# Patient Record
Sex: FEMALE | URBAN - METROPOLITAN AREA
[De-identification: names, ages, dates, MRNs, and addresses within clinical notes are randomized per-mention and may not be internally consistent; named-entity substitution may affect disease eponyms.]

---

## 2024-05-09 ENCOUNTER — APPOINTMENT (RX ONLY)
Dept: URBAN - METROPOLITAN AREA CLINIC 55 | Facility: CLINIC | Age: 64
Setting detail: DERMATOLOGY
End: 2024-05-09

## 2024-05-09 DIAGNOSIS — Z41.9 ENCOUNTER FOR PROCEDURE FOR PURPOSES OTHER THAN REMEDYING HEALTH STATE, UNSPECIFIED: ICD-10-CM

## 2024-05-09 PROCEDURE — ? IN-HOUSE DISPENSING PHARMACY

## 2024-05-09 PROCEDURE — ? INVENTORY

## 2024-05-09 PROCEDURE — ? COSMETIC CONSULTATION: GENERAL

## 2024-05-09 NOTE — PROCEDURE: IN-HOUSE DISPENSING PHARMACY
Product 52 Refills: 0
Product 5 Amount/Unit (Numbers Only): 30
Product 32 Unit Type: mg
Render Product Pricing In Note: Yes
Detail Level: Zone
Product 6 Application Directions: Apply nightly or as directed. Use SPF daily.
Name Of Product 3: Acne Triple Combination Gel
Product 6 Unit Type: ml
Product 3 Refills: 11
Send Charges To Patient Encounter: No
Name Of Product 1: Anti-Aging Brightening Cream
Name Of Product 4: Hydroquinone 8% Emulsion
Product 1 Refills: 2
Product 2 Application Directions: Apply nightly or as directed.
Product 4 Units Dispensed: 1
Product 7 Application Directions: Apply only as directed
Name Of Product 5: Hydrating Tretinoin 0.025% Cream
Name Of Product 6: Rosacea Triple Combination Gel
Name Of Product 7: Lidocaine 23% / Tetracaine 7% Cream
Name Of Product 2: Dapsone / Spironolactone Gel

## 2024-05-31 ENCOUNTER — RX ONLY (OUTPATIENT)
Age: 64
Setting detail: RX ONLY
End: 2024-05-31

## 2024-06-06 ENCOUNTER — APPOINTMENT (RX ONLY)
Dept: URBAN - METROPOLITAN AREA CLINIC 55 | Facility: CLINIC | Age: 64
Setting detail: DERMATOLOGY
End: 2024-06-06

## 2024-06-06 DIAGNOSIS — Z41.9 ENCOUNTER FOR PROCEDURE FOR PURPOSES OTHER THAN REMEDYING HEALTH STATE, UNSPECIFIED: ICD-10-CM

## 2024-06-06 DIAGNOSIS — L82.1 OTHER SEBORRHEIC KERATOSIS: ICD-10-CM

## 2024-06-06 PROCEDURE — ? INVENTORY

## 2024-06-06 PROCEDURE — ? LIQUID NITROGEN (COSMETIC)

## 2024-06-06 PROCEDURE — ? FRAXEL

## 2024-06-06 ASSESSMENT — LOCATION DETAILED DESCRIPTION DERM
LOCATION DETAILED: RIGHT EYEBROW
LOCATION DETAILED: LEFT LATERAL BUCCAL CHEEK
LOCATION DETAILED: LEFT INFERIOR LATERAL MALAR CHEEK
LOCATION DETAILED: LEFT CENTRAL ZYGOMA
LOCATION DETAILED: RIGHT INFERIOR CENTRAL MALAR CHEEK
LOCATION DETAILED: LEFT CENTRAL MANDIBULAR CHEEK
LOCATION DETAILED: RIGHT CENTRAL TEMPLE

## 2024-06-06 ASSESSMENT — LOCATION SIMPLE DESCRIPTION DERM
LOCATION SIMPLE: RIGHT TEMPLE
LOCATION SIMPLE: RIGHT CHEEK
LOCATION SIMPLE: RIGHT EYEBROW
LOCATION SIMPLE: LEFT CHEEK
LOCATION SIMPLE: LEFT ZYGOMA

## 2024-06-06 ASSESSMENT — LOCATION ZONE DERM: LOCATION ZONE: FACE

## 2024-06-06 NOTE — PROCEDURE: LIQUID NITROGEN (COSMETIC)
Total Number Of Lesions Treated: 5
Post-Care Instructions: I reviewed with the patient in detail post-care instructions. Patient is to wear sunprotection, and avoid picking at any of the treated lesions. Pt may apply Vaseline to crusted or scabbing areas.
Duration Of Freeze Thaw-Cycle (Seconds): 0
Render Post Care In The Note?: yes
Consent: The patient's consent was obtained including but not limited to risks of crusting, scabbing, blistering, scarring, darker or lighter pigmentary change, recurrence, incomplete removal and infection.
Detail Level: Zone

## 2024-06-06 NOTE — PROCEDURE: FRAXEL
Small Metal Eye Shield Text: The ocular mucosa was anesthetized with tetracaine. Once adequate anesthesia was optained, small metal eye shields were inserted and remained in place until the procedure was completed.
Anesthesia Type: 1% lidocaine with epinephrine
Energy(Mj/Cm2): 1
Wavelength: 1927nm
Energy(Mj/Cm2): 40
Depth In Microns (Use Numbers Only, No Special Characters Or $): 971
Number Of Passes: 4
Topical Anesthesia Type: 20% benzocaine, 8% lidocaine, 4% tetracaine
Total Energy In Kj (Optional- Don't Include Units): 2.17
Depth In Microns (Use Numbers Only, No Special Characters Or $): 199
Total Coverage: 9%
Large Plastic Eye Shield Text: The ocular mucosa was anesthetized with tetracaine. Once adequate anesthesia was optained, large plastic eye shields were inserted and remained in place until the procedure was completed.
Anesthesia Volume In Cc: 0.5
Total Coverage: 30%
Location: neck
Wavelength: 1550nm
Number Of Passes: 8
Medium Metal Eye Shield Text: The ocular mucosa was anesthetized with tetracaine. Once adequate anesthesia was optained, medium metal eye shields were inserted and remained in place until the procedure was completed.
Depth In Microns (Use Numbers Only, No Special Characters Or $): 5170
Detail Level: Zone
Location: full face
Length Of Topical Anesthesia Application (Optional): 60 minutes
Energy(Mj/Cm2): 20
Consent obtained, risks reviewed.
Large Metal Eye Shield Text: The ocular mucosa was anesthetized with tetracaine. Once adequate anesthesia was optained, large metal eye shields were inserted and remained in place until the procedure was completed.
Total Coverage: 11%
Energy(Mj/Cm2): 15
External Cooling Fan Speed: 5
Add Post-Care Below To The Note: Yes
Location: Use Location Override
Indication: photodamage
Depth In Microns (Use Numbers Only, No Special Characters Or $): 202
Small Plastic Eye Shield Text: The ocular mucosa was anesthetized with tetracaine. Once adequate anesthesia was optained, small plastic eye shields were inserted and remained in place until the procedure was completed.
Total Energy In Kj (Optional- Don't Include Units): 0.03
Was An Eye Shield Used?: No
Treatment Level: 3
Location Override: Lesion on right cheek
Post-Care Instructions: Topical anesthetic removed with gauze. Skin cleansed with a gentle  and prepped with Hibiclens.\\nCO2 lift mask applied post treatment and advised to keep on for 1 hour. Ice packs sent home with patient. \\nI reviewed with the patient in detail post-care instructions. Patient should avoid sun until area fully healed.
Total Coverage: 35%
Medium Plastic Eye Shield Text: The ocular mucosa was anesthetized with tetracaine. Once adequate anesthesia was optained, medium plastic eye shields were inserted and remained in place until the procedure was completed.

## 2024-06-19 ENCOUNTER — APPOINTMENT (RX ONLY)
Dept: URBAN - METROPOLITAN AREA CLINIC 55 | Facility: CLINIC | Age: 64
Setting detail: DERMATOLOGY
End: 2024-06-19

## 2024-06-19 DIAGNOSIS — Z41.9 ENCOUNTER FOR PROCEDURE FOR PURPOSES OTHER THAN REMEDYING HEALTH STATE, UNSPECIFIED: ICD-10-CM

## 2024-06-19 PROCEDURE — ? INVENTORY

## 2024-06-19 PROCEDURE — ? COSMETIC FOLLOW-UP

## 2024-06-19 NOTE — PROCEDURE: COSMETIC FOLLOW-UP
Detail Level: Zone
Comments (Free Text): Post LN2/full face 1927 Fraxel photos obtained from treatment on 6/6/24. Before and after photos reviewed with patient. Patient is very pleased with her results. Discussed 1550 to perioral area and LN2 to a few residual SK’s with KKS in the fall.

## 2025-05-20 ENCOUNTER — APPOINTMENT (OUTPATIENT)
Dept: URBAN - METROPOLITAN AREA CLINIC 55 | Facility: CLINIC | Age: 65
Setting detail: DERMATOLOGY
End: 2025-05-20

## 2025-05-20 DIAGNOSIS — Z41.9 ENCOUNTER FOR PROCEDURE FOR PURPOSES OTHER THAN REMEDYING HEALTH STATE, UNSPECIFIED: ICD-10-CM

## 2025-05-20 DIAGNOSIS — L82.1 OTHER SEBORRHEIC KERATOSIS: ICD-10-CM

## 2025-05-20 PROCEDURE — ? INVENTORY

## 2025-05-20 PROCEDURE — ? IN-HOUSE DISPENSING PHARMACY

## 2025-05-20 PROCEDURE — ? COSMETIC CONSULTATION: SCLEROTHERAPY

## 2025-05-20 PROCEDURE — ? LIQUID NITROGEN (COSMETIC)

## 2025-05-20 PROCEDURE — ? COSMETIC CONSULTATION: GENERAL

## 2025-05-20 PROCEDURE — ? FRAXEL

## 2025-05-20 ASSESSMENT — LOCATION DETAILED DESCRIPTION DERM
LOCATION DETAILED: RIGHT INFERIOR LATERAL BUCCAL CHEEK
LOCATION DETAILED: LEFT CENTRAL OCCIPITAL SCALP
LOCATION DETAILED: RIGHT LATERAL BUCCAL CHEEK
LOCATION DETAILED: LEFT LATERAL MALAR CHEEK
LOCATION DETAILED: LEFT CENTRAL MANDIBULAR CHEEK
LOCATION DETAILED: RIGHT SUPERIOR LATERAL BUCCAL CHEEK
LOCATION DETAILED: RIGHT SUPERIOR LATERAL MALAR CHEEK
LOCATION DETAILED: RIGHT SUPERIOR CENTRAL MALAR CHEEK
LOCATION DETAILED: LEFT CENTRAL BUCCAL CHEEK

## 2025-05-20 ASSESSMENT — LOCATION SIMPLE DESCRIPTION DERM
LOCATION SIMPLE: LEFT CHEEK
LOCATION SIMPLE: RIGHT CHEEK
LOCATION SIMPLE: SCALP

## 2025-05-20 ASSESSMENT — LOCATION ZONE DERM
LOCATION ZONE: FACE
LOCATION ZONE: SCALP

## 2025-05-20 NOTE — PROCEDURE: FRAXEL
Treatment Level: 1
Small Metal Eye Shield Text: The ocular mucosa was anesthetized with tetracaine. Once adequate anesthesia was optained, small metal eye shields were inserted and remained in place until the procedure was completed.
Energy(Mj/Cm2): 15
Indication: resurfacing
Post-Care Instructions: CO2 lift mask was applied to next post treatment.  Chasidy has Alastin enhancement kit for procedure care products.\\nReviewed post care instructions and handout was given to patient.
Location: neck
Treatment Level: 4
Large Plastic Eye Shield Text: The ocular mucosa was anesthetized with tetracaine. Once adequate anesthesia was optained, large plastic eye shields were inserted and remained in place until the procedure was completed.
Tip: 15mm
Medium Metal Eye Shield Text: The ocular mucosa was anesthetized with tetracaine. Once adequate anesthesia was optained, medium metal eye shields were inserted and remained in place until the procedure was completed.
Number Of Passes: 8
Detail Level: Zone
Total Coverage: 35%
Energy(Mj/Cm2): 20
Topical Anesthesia Type: 20% benzocaine, 8% lidocaine, 4% tetracaine
External Cooling: Lara Cryo 5
Depth In Microns (Use Numbers Only, No Special Characters Or $): 199
Energy(Mj/Cm2): 10
Location: full face except eyelids
Large Metal Eye Shield Text: The ocular mucosa was anesthetized with tetracaine. Once adequate anesthesia was optained, large metal eye shields were inserted and remained in place until the procedure was completed.
Total Energy In Kj (Optional- Don't Include Units): 2.31
Treatment Level: 3
Pre-Procedure Text (Will Appear After Anesthesia Text): Patient reports application of hydroquinone 8% cream as directed. The topical anesthetic was removed with gauze. The skin was cleansed with hibiclens and prepped with 70% isopropyl alcohol. \\nLaser goggles placed over the eyes for eye protection.
Length Of Topical Anesthesia Application (Optional): 60 minutes
External Cooling Fan Speed: 5
Depth In Microns (Use Numbers Only, No Special Characters Or $): 202
consent obtained, risks reviewed.
Small Plastic Eye Shield Text: The ocular mucosa was anesthetized with tetracaine. Once adequate anesthesia was optained, small plastic eye shields were inserted and remained in place until the procedure was completed.
Location: perioral area
Was An Eye Shield Used?: No
Add Post-Care Below To The Note: Yes
Medium Plastic Eye Shield Text: The ocular mucosa was anesthetized with tetracaine. Once adequate anesthesia was optained, medium plastic eye shields were inserted and remained in place until the procedure was completed.
Total Coverage: 30%
Wavelength: 1927nm

## 2025-05-20 NOTE — PROCEDURE: LIQUID NITROGEN (COSMETIC)
Total Number Of Lesions Treated: 5
Duration Of Freeze Thaw-Cycle (Seconds): 0
Consent: The patient's consent was obtained including but not limited to risks of crusting, scabbing, blistering, scarring, darker or lighter pigmentary change, recurrence, incomplete removal and infection.
Post-Care Instructions: I reviewed with the patient in detail post-care instructions. Patient is to wear sunprotection, and avoid picking at any of the treated lesions. Pt may apply Vaseline to crusted or scabbing areas.
Render Post Care In The Note?: yes
Detail Level: Zone

## 2025-05-20 NOTE — PROCEDURE: IN-HOUSE DISPENSING PHARMACY
Product 39 Units Dispensed: 0
Product 14 Unit Type: mg
Product 7 Amount/Unit (Numbers Only): 30
Product 7 Application Directions: Apply only as directed
Product 2 Refills: 11
Name Of Product 5: Hydrating Tretinoin 0.025% Cream
Detail Level: Zone
Product 4 Unit Type: ml
Name Of Product 1: Anti-Aging Brightening Cream
Product 4 Application Directions: Apply nightly or as directed.
Render Product Pricing In Note: Yes
Name Of Product 3: Acne Triple Combination Gel
Name Of Product 7: Lidocaine 23% / Tetracaine 7% Cream
Product 3 Application Directions: Apply nightly or as directed. Use SPF daily.
Product 4 Units Dispensed: 1
Name Of Product 6: Rosacea Triple Combination Gel
Send Charges To Patient Encounter: No
Product 7 Refills: 2
Name Of Product 4: Hydroquinone 8% Emulsion
Name Of Product 2: Dapsone / Spironolactone Gel